# Patient Record
Sex: MALE | Race: WHITE
[De-identification: names, ages, dates, MRNs, and addresses within clinical notes are randomized per-mention and may not be internally consistent; named-entity substitution may affect disease eponyms.]

---

## 2021-09-30 ENCOUNTER — HOSPITAL ENCOUNTER (OUTPATIENT)
Dept: HOSPITAL 96 - M.PUL | Age: 46
End: 2021-09-30
Attending: PHYSICAL MEDICINE & REHABILITATION
Payer: COMMERCIAL

## 2021-09-30 DIAGNOSIS — Z86.11: Primary | ICD-10-CM

## 2021-10-08 NOTE — PF
[address]  [address]                    PULMONARY FUNCTION REPORT     
_______________________________________________________________________________
 
Name:       VIKTORIYA YOUSSEF           Room:                      REG CLI 
M..#:  [id number]      Account #:      [id number]  
Admission:  09/30/21     Attend Phys:    Donald Longoriayolandasteve
Discharge:               Date of Birth:  [birth year]  
         Report #: [id number]
                                                                     [id number]
_______________________________________________________________________________
THIS REPORT FOR:  
 
cc:  HORACE MENDES MD, SHEILA MD Pervez,Jono SCHMIDT                                                   ~
DATE OF VISIT: 09/30/2021
 
PULMONARY FUNCTION TEST
 
The FEV1/FVC ratio is normal at 75%.  The FVC is mildly decreased to 79%.  The 
FEV1 is decreased to 75% and the FEF 25-75 is decreased to 72%.  After the 
administration of a bronchodilator, there is no significant increase in any of 
these values.  The patient's post-bronchodilator FEV1 is noted to be 3.39 
liters.  Only a spirometry was performed.
 
IMPRESSION:  There is a mild restrictive pattern on spirometry.  Possible 
underlying etiologies could include mild obstruction, mild restriction, mild 
neurological or muscular weakness or poor effort.  If clinically indicated, then
this could be investigated further while performing full pulmonary function test
with lung volumes.
 
 
 
 
 
 
 
 
 
 
 
 
 
 
 
 
 
 
 
 
 
 
 
 
<ELECTRONICALLY SIGNED>
                                        By:  Jono Hogue MD              
10/08/21     0827
D: 10/07/21 2158_______________________________________
T: 10/07/21 2211Aarcelia Hogue MD                 /nt